# Patient Record
Sex: MALE | Race: WHITE | Employment: FULL TIME | URBAN - METROPOLITAN AREA
[De-identification: names, ages, dates, MRNs, and addresses within clinical notes are randomized per-mention and may not be internally consistent; named-entity substitution may affect disease eponyms.]

---

## 2018-06-15 ENCOUNTER — APPOINTMENT (OUTPATIENT)
Dept: CT IMAGING | Age: 33
End: 2018-06-15
Attending: EMERGENCY MEDICINE
Payer: COMMERCIAL

## 2018-06-15 ENCOUNTER — HOSPITAL ENCOUNTER (EMERGENCY)
Age: 33
Discharge: HOME | End: 2018-06-15
Attending: EMERGENCY MEDICINE
Payer: COMMERCIAL

## 2018-06-15 VITALS
SYSTOLIC BLOOD PRESSURE: 129 MMHG | HEART RATE: 57 BPM | TEMPERATURE: 98.7 F | RESPIRATION RATE: 18 BRPM | BODY MASS INDEX: 25.03 KG/M2 | WEIGHT: 195 LBS | OXYGEN SATURATION: 99 % | HEIGHT: 74 IN | DIASTOLIC BLOOD PRESSURE: 93 MMHG

## 2018-06-15 DIAGNOSIS — N23 RENAL COLIC ON RIGHT SIDE: Primary | ICD-10-CM

## 2018-06-15 LAB
ANION GAP BLD CALC-SCNC: 13 MMOL/L (ref 10–20)
APPEARANCE UR: CLEAR
BACTERIA URNS QL MICRO: ABNORMAL /HPF
BILIRUB UR QL: NEGATIVE
BUN BLD-MCNC: 18 MG/DL (ref 7–18)
CA-I BLD-MCNC: 1.21 MMOL/L (ref 1.12–1.32)
CHLORIDE BLD-SCNC: 106 MMOL/L (ref 100–108)
CO2 BLD-SCNC: 27 MMOL/L (ref 19–24)
COLOR UR: YELLOW
CREAT UR-MCNC: 0.9 MG/DL (ref 0.6–1.3)
EPITH CASTS URNS QL MICRO: ABNORMAL /LPF (ref 0–5)
GLUCOSE BLD STRIP.AUTO-MCNC: 113 MG/DL (ref 74–106)
GLUCOSE UR STRIP.AUTO-MCNC: NEGATIVE MG/DL
HCT VFR BLD CALC: 45 % (ref 36–49)
HGB BLD-MCNC: 15.3 G/DL (ref 12–16)
HGB UR QL STRIP: ABNORMAL
KETONES UR QL STRIP.AUTO: NEGATIVE MG/DL
LEUKOCYTE ESTERASE UR QL STRIP.AUTO: NEGATIVE
NITRITE UR QL STRIP.AUTO: NEGATIVE
PH UR STRIP: 6 [PH] (ref 5–8)
POTASSIUM BLD-SCNC: 4 MMOL/L (ref 3.5–5.5)
PROT UR STRIP-MCNC: NEGATIVE MG/DL
RBC #/AREA URNS HPF: ABNORMAL /HPF (ref 0–5)
SODIUM BLD-SCNC: 141 MMOL/L (ref 136–145)
SP GR UR REFRACTOMETRY: 1.01 (ref 1–1.03)
UROBILINOGEN UR QL STRIP.AUTO: 0.2 EU/DL (ref 0.2–1)
WBC URNS QL MICRO: ABNORMAL /HPF (ref 0–4)

## 2018-06-15 PROCEDURE — 74176 CT ABD & PELVIS W/O CONTRAST: CPT

## 2018-06-15 PROCEDURE — 80047 BASIC METABLC PNL IONIZED CA: CPT

## 2018-06-15 PROCEDURE — 99283 EMERGENCY DEPT VISIT LOW MDM: CPT

## 2018-06-15 PROCEDURE — 81001 URINALYSIS AUTO W/SCOPE: CPT | Performed by: EMERGENCY MEDICINE

## 2018-06-15 RX ORDER — ONDANSETRON 8 MG/1
8 TABLET, ORALLY DISINTEGRATING ORAL
Qty: 6 TAB | Refills: 0 | Status: SHIPPED | OUTPATIENT
Start: 2018-06-15

## 2018-06-15 RX ORDER — HYDROCODONE BITARTRATE AND ACETAMINOPHEN 5; 325 MG/1; MG/1
1 TABLET ORAL
Qty: 6 TAB | Refills: 0 | Status: SHIPPED | OUTPATIENT
Start: 2018-06-15

## 2018-06-15 NOTE — ED NOTES
Pt discharged home stable and ambulatory. Pain level at discharge 0 . Pt discharged with friend/family. Reviewed discharged instructions with  rx and verbalized understanding.    Patient armband removed and shredded

## 2018-06-15 NOTE — DISCHARGE INSTRUCTIONS

## 2018-06-15 NOTE — ED TRIAGE NOTES
Patient reports sudden sharp pain in his RLQ that started this morning. Patient pacing in triage, from pain.

## 2018-06-15 NOTE — ED PROVIDER NOTES
EMERGENCY DEPARTMENT HISTORY AND PHYSICAL EXAM    9:18 AM      Date: 6/15/2018  Patient Name: Erin Bernard    History of Presenting Illness     Chief Complaint   Patient presents with    Abdominal Pain         History Provided By: Patient    Chief Complaint: Abd pain  Duration:  1 Hour  Timing:  Acute and Intermittent  Location: Right sided  Quality: Dull and Sharp  Severity: Moderate  Modifying Factors: No exacerbating factors  Associated Symptoms: vomiting, dysuria, diaphoresis      Additional History (Context): 9:28 AM Erin Bernard is a 35 y.o. male with h/o kidney stone who presents to ED complaining of moderate acute dull right sided abd pain with intermittent sharp pains associated with vomiting, factors, and diphoresis onset 1 hour ago. Patient states that he was taking his kid to day care when he got this sudden sharp right sided abd pain. The pain gets dull but is now improved. He does not have any pain right now but states there were bouts of intermittent sharp pains. He admits to vomiting once on ED arrival and some dysuria when he went to the bathroom. The pain was also so bad before that he was sweating. Denies any hematuria or diarrhea. Denies smoking. Admits to occasional ETOH use. He follows with [facility]. Denies any abd surgeries. He has had a kidney stone in the past. No other concerns or symptoms at this time. PCP: Naheed Kauffman MD      Past History     Past Medical History:  Past Medical History:   Diagnosis Date    Kidney stone        Past Surgical History:  History reviewed. No pertinent surgical history. Family History:  History reviewed. No pertinent family history. Social History:  Social History   Substance Use Topics    Smoking status: Never Smoker    Smokeless tobacco: Never Used    Alcohol use Yes      Comment: social       Allergies:  No Known Allergies      Review of Systems       Review of Systems   Constitutional: Positive for diaphoresis. Gastrointestinal: Positive for abdominal pain, nausea and vomiting. Negative for diarrhea. Genitourinary: Positive for dysuria. Negative for hematuria. All other systems reviewed and are negative. Physical Exam     Visit Vitals    BP (!) 129/93 (BP 1 Location: Right arm, BP Patient Position: At rest)    Pulse (!) 57    Temp 98.7 °F (37.1 °C)    Resp 18    Ht 6' 2\" (1.88 m)    Wt 88.5 kg (195 lb)    SpO2 99%    BMI 25.04 kg/m2         Physical Exam   Constitutional: He is oriented to person, place, and time. He appears well-developed and well-nourished. No distress. HENT:   Head: Normocephalic and atraumatic. Mouth/Throat: Oropharynx is clear and moist.   Eyes: Conjunctivae and EOM are normal. Pupils are equal, round, and reactive to light. No scleral icterus. Neck: Normal range of motion. Neck supple. Cardiovascular: Normal rate, regular rhythm and normal heart sounds. No murmur heard. Pulmonary/Chest: Effort normal and breath sounds normal. No respiratory distress. Abdominal: Soft. Bowel sounds are normal. He exhibits no distension. There is no tenderness. Musculoskeletal: He exhibits no edema. Lymphadenopathy:     He has no cervical adenopathy. Neurological: He is alert and oriented to person, place, and time. Coordination normal.   Skin: Skin is warm and dry. No rash noted. Psychiatric: He has a normal mood and affect. His behavior is normal.   Nursing note and vitals reviewed.         Diagnostic Study Results     Labs -  Recent Results (from the past 12 hour(s))   URINALYSIS W/ RFLX MICROSCOPIC    Collection Time: 06/15/18  9:41 AM   Result Value Ref Range    Color YELLOW      Appearance CLEAR      Specific gravity 1.013 1.005 - 1.030      pH (UA) 6.0 5.0 - 8.0      Protein NEGATIVE  NEG mg/dL    Glucose NEGATIVE  NEG mg/dL    Ketone NEGATIVE  NEG mg/dL    Bilirubin NEGATIVE  NEG      Blood TRACE (A) NEG      Urobilinogen 0.2 0.2 - 1.0 EU/dL    Nitrites NEGATIVE  NEG Leukocyte Esterase NEGATIVE  NEG     URINE MICROSCOPIC ONLY    Collection Time: 06/15/18  9:41 AM   Result Value Ref Range    WBC 0 to 2 0 - 4 /hpf    RBC 11 to 20 0 - 5 /hpf    Epithelial cells FEW 0 - 5 /lpf    Bacteria FEW (A) NEG /hpf   POC CHEM8    Collection Time: 06/15/18  9:44 AM   Result Value Ref Range    CO2, POC 27 (H) 19 - 24 MMOL/L    Glucose,  (H) 74 - 106 MG/DL    BUN, POC 18 7 - 18 MG/DL    Creatinine, POC 0.9 0.6 - 1.3 MG/DL    GFRAA, POC >60 >60 ml/min/1.73m2    GFRNA, POC >60 >60 ml/min/1.73m2    Sodium,  136 - 145 MMOL/L    Potassium, POC 4.0 3.5 - 5.5 MMOL/L    Calcium, ionized (POC) 1.21 1.12 - 1.32 mmol/L    Chloride,  100 - 108 MMOL/L    Anion gap, POC 13 10 - 20      Hematocrit, POC 45 36 - 49 %    Hemoglobin, POC 15.3 12 - 16 G/DL       Radiologic Studies -   CT ABD PELV WO CONT   Final Result            Medical Decision Making   I am the first provider for this patient. I reviewed the vital signs, available nursing notes, past medical history, past surgical history, family history and social history. Vital Signs-Reviewed the patient's vital signs. Pulse Oximetry Analysis -  100% on room air , Normal    Records Reviewed: Nursing Notes and Old Medical Records (Time of Review: 9:18 AM)    ED Course: Progress Notes, Reevaluation, and Consults:      Provider Notes (Medical Decision Making):   MDM  Number of Diagnoses or Management Options  Renal colic on right side:   Diagnosis management comments: r lower quad pain pacing diaphoretic in triage with vomiting now resolved no tenderness? Renal colic     Pt no abd tenderness able to jump in Ed with no pain        Amount and/or Complexity of Data Reviewed  Clinical lab tests: ordered and reviewed  Tests in the radiology section of CPT®: ordered and reviewed          Diagnosis     Clinical Impression:   1.  Renal colic on right side        Disposition: home     Follow-up Information     Follow up With Details Comments Contact [facility] EMERGENCY DEPT  As needed, If symptoms worsen [id number]  [phone]    [facility] Schedule an appointment as soon as possible for a visit for ED follow up appointment  [address] [address] [phone]           Patient's Medications   Start Taking    HYDROCODONE-ACETAMINOPHEN (NORCO) 5-325 MG PER TABLET    Take 1 Tab by mouth every four (4) hours as needed for Pain. Max Daily Amount: 6 Tabs. ONDANSETRON (ZOFRAN ODT) 8 MG DISINTEGRATING TABLET    Take 1 Tab by mouth every eight (8) hours as needed for Nausea. Continue Taking    No medications on file   These Medications have changed    No medications on file   Stop Taking    No medications on file     _______________________________    Attestations:  Klaus Montiel acting as a scribe for and in the presence of Real Rosen MD      Amy 15, 2018 at 9:18 AM       Provider Attestation:      I personally performed the services described in the documentation, reviewed the documentation, as recorded by the scribe in my presence, and it accurately and completely records my words and actions.  Amy 15, 2018 at 9:18 AM - Real Rosen MD    _______________________________